# Patient Record
Sex: FEMALE | Race: WHITE | NOT HISPANIC OR LATINO | URBAN - METROPOLITAN AREA
[De-identification: names, ages, dates, MRNs, and addresses within clinical notes are randomized per-mention and may not be internally consistent; named-entity substitution may affect disease eponyms.]

---

## 2017-11-14 ENCOUNTER — OFFICE (OUTPATIENT)
Dept: URBAN - METROPOLITAN AREA CLINIC 75 | Facility: CLINIC | Age: 72
End: 2017-11-14
Payer: COMMERCIAL

## 2017-11-14 VITALS
SYSTOLIC BLOOD PRESSURE: 116 MMHG | WEIGHT: 138 LBS | HEIGHT: 65 IN | HEART RATE: 64 BPM | DIASTOLIC BLOOD PRESSURE: 84 MMHG

## 2017-11-14 DIAGNOSIS — K30 FUNCTIONAL DYSPEPSIA: ICD-10-CM

## 2017-11-14 DIAGNOSIS — R11.0 NAUSEA: ICD-10-CM

## 2017-11-14 PROCEDURE — 99202 OFFICE O/P NEW SF 15 MIN: CPT

## 2017-11-14 RX ORDER — ONDANSETRON 4 MG/1
12 TABLET, ORALLY DISINTEGRATING ORAL
Qty: 45 | Refills: 4 | Status: ACTIVE
Start: 2017-11-14

## 2017-12-04 VITALS
HEIGHT: 65 IN | SYSTOLIC BLOOD PRESSURE: 130 MMHG | HEART RATE: 78 BPM | DIASTOLIC BLOOD PRESSURE: 64 MMHG | WEIGHT: 140 LBS

## 2017-12-06 ENCOUNTER — OFFICE (OUTPATIENT)
Dept: URBAN - METROPOLITAN AREA CLINIC 75 | Facility: CLINIC | Age: 72
End: 2017-12-06
Payer: COMMERCIAL

## 2017-12-06 DIAGNOSIS — K21.9 GASTRO-ESOPHAGEAL REFLUX DISEASE WITHOUT ESOPHAGITIS: ICD-10-CM

## 2017-12-06 DIAGNOSIS — R11.0 NAUSEA: ICD-10-CM

## 2017-12-06 DIAGNOSIS — R10.11 RIGHT UPPER QUADRANT PAIN: ICD-10-CM

## 2017-12-06 PROCEDURE — 99213 OFFICE O/P EST LOW 20 MIN: CPT

## 2018-01-09 ENCOUNTER — ON CAMPUS - OUTPATIENT (OUTPATIENT)
Dept: URBAN - METROPOLITAN AREA HOSPITAL 108 | Facility: HOSPITAL | Age: 73
End: 2018-01-09
Payer: MEDICARE

## 2018-01-09 DIAGNOSIS — R10.11 RIGHT UPPER QUADRANT PAIN: ICD-10-CM

## 2018-01-09 DIAGNOSIS — R93.3 ABNORMAL FINDINGS ON DIAGNOSTIC IMAGING OF OTHER PARTS OF DI: ICD-10-CM

## 2018-01-09 DIAGNOSIS — K86.89 OTHER SPECIFIED DISEASES OF PANCREAS: ICD-10-CM

## 2018-01-09 DIAGNOSIS — K83.8 OTHER SPECIFIED DISEASES OF BILIARY TRACT: ICD-10-CM

## 2018-01-09 DIAGNOSIS — K83.1 OBSTRUCTION OF BILE DUCT: ICD-10-CM

## 2018-01-09 PROCEDURE — 43237 ENDOSCOPIC US EXAM ESOPH: CPT

## 2018-01-18 ENCOUNTER — ON CAMPUS - OUTPATIENT (OUTPATIENT)
Dept: URBAN - METROPOLITAN AREA HOSPITAL 108 | Facility: HOSPITAL | Age: 73
End: 2018-01-18
Payer: MEDICARE

## 2018-01-18 DIAGNOSIS — K83.1 OBSTRUCTION OF BILE DUCT: ICD-10-CM

## 2018-01-18 PROCEDURE — 43259 EGD US EXAM DUODENUM/JEJUNUM: CPT

## 2018-02-21 ENCOUNTER — OFFICE (OUTPATIENT)
Dept: URBAN - METROPOLITAN AREA CLINIC 75 | Facility: CLINIC | Age: 73
End: 2018-02-21
Payer: COMMERCIAL

## 2018-02-21 VITALS
HEART RATE: 80 BPM | DIASTOLIC BLOOD PRESSURE: 56 MMHG | SYSTOLIC BLOOD PRESSURE: 110 MMHG | WEIGHT: 142 LBS | HEIGHT: 65 IN

## 2018-02-21 DIAGNOSIS — K30 FUNCTIONAL DYSPEPSIA: ICD-10-CM

## 2018-02-21 DIAGNOSIS — R10.11 RIGHT UPPER QUADRANT PAIN: ICD-10-CM

## 2018-02-21 DIAGNOSIS — R11.0 NAUSEA: ICD-10-CM

## 2018-02-21 PROCEDURE — 99213 OFFICE O/P EST LOW 20 MIN: CPT

## 2018-03-15 ENCOUNTER — ON CAMPUS - OUTPATIENT (OUTPATIENT)
Dept: URBAN - METROPOLITAN AREA HOSPITAL 108 | Facility: HOSPITAL | Age: 73
End: 2018-03-15
Payer: COMMERCIAL

## 2018-03-15 DIAGNOSIS — K83.1 OBSTRUCTION OF BILE DUCT: ICD-10-CM

## 2018-03-15 DIAGNOSIS — Z96.89 PRESENCE OF OTHER SPECIFIED FUNCTIONAL IMPLANTS: ICD-10-CM

## 2018-03-15 DIAGNOSIS — Z98.890 OTHER SPECIFIED POSTPROCEDURAL STATES: ICD-10-CM

## 2018-03-15 PROCEDURE — 43275 ERCP REMOVE FORGN BODY DUCT: CPT

## 2018-05-09 ENCOUNTER — OFFICE (OUTPATIENT)
Dept: URBAN - METROPOLITAN AREA CLINIC 75 | Facility: CLINIC | Age: 73
End: 2018-05-09

## 2018-05-09 VITALS
WEIGHT: 139 LBS | DIASTOLIC BLOOD PRESSURE: 65 MMHG | HEIGHT: 65 IN | HEART RATE: 94 BPM | SYSTOLIC BLOOD PRESSURE: 109 MMHG

## 2018-05-09 DIAGNOSIS — R10.11 RIGHT UPPER QUADRANT PAIN: ICD-10-CM

## 2018-05-09 PROCEDURE — 99213 OFFICE O/P EST LOW 20 MIN: CPT

## 2018-05-09 RX ORDER — DICYCLOMINE HYDROCHLORIDE 10 MG/1
30 CAPSULE ORAL
Qty: 90 | Refills: 3 | Status: ACTIVE
Start: 2017-12-14

## 2025-02-26 ENCOUNTER — APPOINTMENT (OUTPATIENT)
Dept: CT IMAGING | Facility: HOSPITAL | Age: 80
End: 2025-02-26
Payer: MEDICARE

## 2025-02-26 ENCOUNTER — HOSPITAL ENCOUNTER (EMERGENCY)
Facility: HOSPITAL | Age: 80
Discharge: HOME | End: 2025-02-26
Attending: EMERGENCY MEDICINE | Admitting: EMERGENCY MEDICINE
Payer: MEDICARE

## 2025-02-26 VITALS
TEMPERATURE: 98 F | DIASTOLIC BLOOD PRESSURE: 62 MMHG | SYSTOLIC BLOOD PRESSURE: 145 MMHG | HEART RATE: 84 BPM | RESPIRATION RATE: 20 BRPM | HEIGHT: 65 IN | WEIGHT: 134.92 LBS | BODY MASS INDEX: 22.48 KG/M2 | OXYGEN SATURATION: 97 %

## 2025-02-26 DIAGNOSIS — S32.000A LUMBAR COMPRESSION FRACTURE, CLOSED, INITIAL ENCOUNTER: ICD-10-CM

## 2025-02-26 DIAGNOSIS — R10.11 ABDOMINAL PAIN, ACUTE, RIGHT UPPER QUADRANT: Primary | ICD-10-CM

## 2025-02-26 LAB
ALBUMIN SERPL-MCNC: 4.8 G/DL (ref 3.5–5.2)
ALBUMIN/GLOB SERPL: 1.7 G/DL
ALP SERPL-CCNC: 83 U/L (ref 39–117)
ALT SERPL W P-5'-P-CCNC: 13 U/L (ref 1–33)
ANION GAP SERPL CALCULATED.3IONS-SCNC: 14.6 MMOL/L (ref 5–15)
AST SERPL-CCNC: 18 U/L (ref 1–32)
BACTERIA UR QL AUTO: ABNORMAL /HPF
BASOPHILS # BLD AUTO: 0.04 10*3/MM3 (ref 0–0.2)
BASOPHILS NFR BLD AUTO: 0.4 % (ref 0–1.5)
BILIRUB SERPL-MCNC: 0.6 MG/DL (ref 0–1.2)
BILIRUB UR QL STRIP: NEGATIVE
BUN SERPL-MCNC: 17 MG/DL (ref 8–23)
BUN/CREAT SERPL: 28.8 (ref 7–25)
CALCIUM SPEC-SCNC: 10.5 MG/DL (ref 8.6–10.5)
CHLORIDE SERPL-SCNC: 97 MMOL/L (ref 98–107)
CLARITY UR: ABNORMAL
CO2 SERPL-SCNC: 26.4 MMOL/L (ref 22–29)
COLOR UR: YELLOW
CREAT SERPL-MCNC: 0.59 MG/DL (ref 0.57–1)
D-LACTATE SERPL-SCNC: 1.7 MMOL/L (ref 0.5–2)
DEPRECATED RDW RBC AUTO: 41.1 FL (ref 37–54)
EGFRCR SERPLBLD CKD-EPI 2021: 93 ML/MIN/1.73
EOSINOPHIL # BLD AUTO: 0.04 10*3/MM3 (ref 0–0.4)
EOSINOPHIL NFR BLD AUTO: 0.4 % (ref 0.3–6.2)
ERYTHROCYTE [DISTWIDTH] IN BLOOD BY AUTOMATED COUNT: 12.7 % (ref 12.3–15.4)
GLOBULIN UR ELPH-MCNC: 2.8 GM/DL
GLUCOSE SERPL-MCNC: 104 MG/DL (ref 65–99)
GLUCOSE UR STRIP-MCNC: NEGATIVE MG/DL
HCT VFR BLD AUTO: 30.3 % (ref 34–46.6)
HGB BLD-MCNC: 10.2 G/DL (ref 12–15.9)
HGB UR QL STRIP.AUTO: ABNORMAL
HOLD SPECIMEN: NORMAL
HOLD SPECIMEN: NORMAL
HYALINE CASTS UR QL AUTO: ABNORMAL /LPF
IMM GRANULOCYTES # BLD AUTO: 0.05 10*3/MM3 (ref 0–0.05)
IMM GRANULOCYTES NFR BLD AUTO: 0.5 % (ref 0–0.5)
KETONES UR QL STRIP: NEGATIVE
LEUKOCYTE ESTERASE UR QL STRIP.AUTO: ABNORMAL
LIPASE SERPL-CCNC: 16 U/L (ref 13–60)
LYMPHOCYTES # BLD AUTO: 1.99 10*3/MM3 (ref 0.7–3.1)
LYMPHOCYTES NFR BLD AUTO: 21.5 % (ref 19.6–45.3)
MCH RBC QN AUTO: 30.5 PG (ref 26.6–33)
MCHC RBC AUTO-ENTMCNC: 33.7 G/DL (ref 31.5–35.7)
MCV RBC AUTO: 90.7 FL (ref 79–97)
MONOCYTES # BLD AUTO: 0.47 10*3/MM3 (ref 0.1–0.9)
MONOCYTES NFR BLD AUTO: 5.1 % (ref 5–12)
NEUTROPHILS NFR BLD AUTO: 6.67 10*3/MM3 (ref 1.7–7)
NEUTROPHILS NFR BLD AUTO: 72.1 % (ref 42.7–76)
NITRITE UR QL STRIP: NEGATIVE
NRBC BLD AUTO-RTO: 0 /100 WBC (ref 0–0.2)
PH UR STRIP.AUTO: 8.5 [PH] (ref 5–8)
PLATELET # BLD AUTO: 443 10*3/MM3 (ref 140–450)
PMV BLD AUTO: 9 FL (ref 6–12)
POTASSIUM SERPL-SCNC: 3.8 MMOL/L (ref 3.5–5.2)
PROT SERPL-MCNC: 7.6 G/DL (ref 6–8.5)
PROT UR QL STRIP: ABNORMAL
RBC # BLD AUTO: 3.34 10*6/MM3 (ref 3.77–5.28)
RBC # UR STRIP: ABNORMAL /HPF
REF LAB TEST METHOD: ABNORMAL
SODIUM SERPL-SCNC: 138 MMOL/L (ref 136–145)
SP GR UR STRIP: 1.02 (ref 1–1.03)
SQUAMOUS #/AREA URNS HPF: ABNORMAL /HPF
UROBILINOGEN UR QL STRIP: ABNORMAL
WBC # UR STRIP: ABNORMAL /HPF
WBC NRBC COR # BLD AUTO: 9.26 10*3/MM3 (ref 3.4–10.8)
WHOLE BLOOD HOLD COAG: NORMAL
WHOLE BLOOD HOLD SPECIMEN: NORMAL

## 2025-02-26 PROCEDURE — 74176 CT ABD & PELVIS W/O CONTRAST: CPT

## 2025-02-26 PROCEDURE — 25010000002 MORPHINE PER 10 MG: Performed by: EMERGENCY MEDICINE

## 2025-02-26 PROCEDURE — 81001 URINALYSIS AUTO W/SCOPE: CPT | Performed by: EMERGENCY MEDICINE

## 2025-02-26 PROCEDURE — 83690 ASSAY OF LIPASE: CPT | Performed by: EMERGENCY MEDICINE

## 2025-02-26 PROCEDURE — 83605 ASSAY OF LACTIC ACID: CPT | Performed by: EMERGENCY MEDICINE

## 2025-02-26 PROCEDURE — 85025 COMPLETE CBC W/AUTO DIFF WBC: CPT

## 2025-02-26 PROCEDURE — 99284 EMERGENCY DEPT VISIT MOD MDM: CPT

## 2025-02-26 PROCEDURE — 25010000002 ONDANSETRON PER 1 MG: Performed by: EMERGENCY MEDICINE

## 2025-02-26 PROCEDURE — 96374 THER/PROPH/DIAG INJ IV PUSH: CPT

## 2025-02-26 PROCEDURE — 96375 TX/PRO/DX INJ NEW DRUG ADDON: CPT

## 2025-02-26 PROCEDURE — 80053 COMPREHEN METABOLIC PANEL: CPT | Performed by: EMERGENCY MEDICINE

## 2025-02-26 RX ORDER — MORPHINE SULFATE 2 MG/ML
4 INJECTION, SOLUTION INTRAMUSCULAR; INTRAVENOUS ONCE
Status: COMPLETED | OUTPATIENT
Start: 2025-02-26 | End: 2025-02-26

## 2025-02-26 RX ORDER — FAMOTIDINE 10 MG/ML
20 INJECTION, SOLUTION INTRAVENOUS ONCE
Status: COMPLETED | OUTPATIENT
Start: 2025-02-26 | End: 2025-02-26

## 2025-02-26 RX ORDER — LIDOCAINE 4 G/G
1 PATCH TOPICAL DAILY
Qty: 5 EACH | Refills: 0 | Status: SHIPPED | OUTPATIENT
Start: 2025-02-26

## 2025-02-26 RX ORDER — ONDANSETRON 2 MG/ML
4 INJECTION INTRAMUSCULAR; INTRAVENOUS ONCE
Status: COMPLETED | OUTPATIENT
Start: 2025-02-26 | End: 2025-02-26

## 2025-02-26 RX ORDER — SODIUM CHLORIDE 0.9 % (FLUSH) 0.9 %
10 SYRINGE (ML) INJECTION AS NEEDED
Status: DISCONTINUED | OUTPATIENT
Start: 2025-02-26 | End: 2025-02-26 | Stop reason: HOSPADM

## 2025-02-26 RX ADMIN — MORPHINE SULFATE 4 MG: 2 INJECTION, SOLUTION INTRAMUSCULAR; INTRAVENOUS at 08:56

## 2025-02-26 RX ADMIN — ONDANSETRON 4 MG: 2 INJECTION, SOLUTION INTRAMUSCULAR; INTRAVENOUS at 08:52

## 2025-02-26 RX ADMIN — FAMOTIDINE 20 MG: 10 INJECTION INTRAVENOUS at 08:52

## 2025-02-26 NOTE — ED PROVIDER NOTES
Brief history of present illness: 79 female's been struggling a lot with her back pain recently from vertebral collapse and has been on several medications reports progressive right upper quadrant abdominal discomfort when she takes her pills.  She has been seen by gastroenterology, Dr. Soliman, who is recommended Carafate though when she was taking this she reported increased insomnia and restlessness at night.  She was taking it at the same time as her gabapentin and has only been using her Zoloft as needed.  No vomiting or bloody stools reported.  No fevers.  She has been on a BRAT diet which seems to have helped.  Past surgical history includes cholecystectomy      Physical Exam   Constitutional: No distress.  Nontoxic  HENT:  Head: Normocephalic and atraumatic.   Oropharynx: Mucous membranes are moist.   Eyes: . No scleral icterus. No conjunctival pallor.  Neck: Normal range of motion. Neck supple.   Cardiovascular: Pink warm and well perfused throughout.    Pulmonary/Chest: No respiratory distress.  No tachypnea or increased work of breathing appreciated.    Abdominal: Soft. There is mild right upper quadrant tenderness. There is no rebound and no guarding.  No peritoneal findings  Musculoskeletal: Moves all extremities equally.    Neurological: Alert and oriented.  No acute focal deficit appreciated.  Skin: Skin is pink, warm, and dry.   Psychiatric: Mood and affect normal.   Nursing note and vitals reviewed.      MDM:   Results for orders placed or performed during the hospital encounter of 02/26/25   Comprehensive Metabolic Panel    Collection Time: 02/26/25  7:49 AM    Specimen: Blood   Result Value Ref Range    Glucose 104 (H) 65 - 99 mg/dL    BUN 17 8 - 23 mg/dL    Creatinine 0.59 0.57 - 1.00 mg/dL    Sodium 138 136 - 145 mmol/L    Potassium 3.8 3.5 - 5.2 mmol/L    Chloride 97 (L) 98 - 107 mmol/L    CO2 26.4 22.0 - 29.0 mmol/L    Calcium 10.5 8.6 - 10.5 mg/dL    Total Protein 7.6 6.0 - 8.5 g/dL     Albumin 4.8 3.5 - 5.2 g/dL    ALT (SGPT) 13 1 - 33 U/L    AST (SGOT) 18 1 - 32 U/L    Alkaline Phosphatase 83 39 - 117 U/L    Total Bilirubin 0.6 0.0 - 1.2 mg/dL    Globulin 2.8 gm/dL    A/G Ratio 1.7 g/dL    BUN/Creatinine Ratio 28.8 (H) 7.0 - 25.0    Anion Gap 14.6 5.0 - 15.0 mmol/L    eGFR 93.0 >60.0 mL/min/1.73   Lipase    Collection Time: 02/26/25  7:49 AM    Specimen: Blood   Result Value Ref Range    Lipase 16 13 - 60 U/L   Lactic Acid, Plasma    Collection Time: 02/26/25  7:49 AM    Specimen: Blood   Result Value Ref Range    Lactate 1.7 0.5 - 2.0 mmol/L   CBC Auto Differential    Collection Time: 02/26/25  7:49 AM    Specimen: Blood   Result Value Ref Range    WBC 9.26 3.40 - 10.80 10*3/mm3    RBC 3.34 (L) 3.77 - 5.28 10*6/mm3    Hemoglobin 10.2 (L) 12.0 - 15.9 g/dL    Hematocrit 30.3 (L) 34.0 - 46.6 %    MCV 90.7 79.0 - 97.0 fL    MCH 30.5 26.6 - 33.0 pg    MCHC 33.7 31.5 - 35.7 g/dL    RDW 12.7 12.3 - 15.4 %    RDW-SD 41.1 37.0 - 54.0 fl    MPV 9.0 6.0 - 12.0 fL    Platelets 443 140 - 450 10*3/mm3    Neutrophil % 72.1 42.7 - 76.0 %    Lymphocyte % 21.5 19.6 - 45.3 %    Monocyte % 5.1 5.0 - 12.0 %    Eosinophil % 0.4 0.3 - 6.2 %    Basophil % 0.4 0.0 - 1.5 %    Immature Grans % 0.5 0.0 - 0.5 %    Neutrophils, Absolute 6.67 1.70 - 7.00 10*3/mm3    Lymphocytes, Absolute 1.99 0.70 - 3.10 10*3/mm3    Monocytes, Absolute 0.47 0.10 - 0.90 10*3/mm3    Eosinophils, Absolute 0.04 0.00 - 0.40 10*3/mm3    Basophils, Absolute 0.04 0.00 - 0.20 10*3/mm3    Immature Grans, Absolute 0.05 0.00 - 0.05 10*3/mm3    nRBC 0.0 0.0 - 0.2 /100 WBC   Green Top (Gel)    Collection Time: 02/26/25  7:49 AM   Result Value Ref Range    Extra Tube Hold for add-ons.    Lavender Top    Collection Time: 02/26/25  7:49 AM   Result Value Ref Range    Extra Tube hold for add-on    Gold Top - SST    Collection Time: 02/26/25  7:49 AM   Result Value Ref Range    Extra Tube Hold for add-ons.    Light Blue Top    Collection Time: 02/26/25  7:49 AM    Result Value Ref Range    Extra Tube Hold for add-ons.    Urinalysis With Microscopic If Indicated (No Culture) - Urine, Clean Catch    Collection Time: 02/26/25  8:52 AM    Specimen: Urine, Clean Catch   Result Value Ref Range    Color, UA Yellow Yellow, Straw    Appearance, UA Turbid (A) Clear    pH, UA 8.5 (H) 5.0 - 8.0    Specific Gravity, UA 1.017 1.005 - 1.030    Glucose, UA Negative Negative    Ketones, UA Negative Negative    Bilirubin, UA Negative Negative    Blood, UA Trace (A) Negative    Protein,  mg/dL (2+) (A) Negative    Leuk Esterase, UA Trace (A) Negative    Nitrite, UA Negative Negative    Urobilinogen, UA 0.2 E.U./dL 0.2 - 1.0 E.U./dL   Urinalysis, Microscopic Only - Urine, Clean Catch    Collection Time: 02/26/25  8:52 AM    Specimen: Urine, Clean Catch   Result Value Ref Range    RBC, UA 11-20 (A) None Seen, 0-2 /HPF    WBC, UA 0-2 None Seen, 0-2 /HPF    Bacteria, UA None Seen None Seen /HPF    Squamous Epithelial Cells, UA 0-2 None Seen, 0-2 /HPF    Hyaline Casts, UA 3-6 None Seen /LPF    Methodology Automated Microscopy        CT Abdomen Pelvis Without Contrast    Result Date: 2/26/2025  Narrative: CT ABDOMEN PELVIS WO CONTRAST-  HISTORY: 79 years of age, Female. Upper abdominal pain.  TECHNIQUE:  CT includes axial imaging from the lung bases to the trochanters with intravenous contrast and with use of oral contrast. Data reconstructed in coronal and sagittal planes. Radiation dose reduction techniques were utilized, including automated exposure control and exposure modulation based on body size.  COMPARISON: None.  FINDINGS: Calcified nodule left lower lobe. Liver, spleen, adrenal glands, pancreas, kidneys appear within normal limits. Previous cholecystectomy. Pneumobilia. No hydronephrosis. No renal or ureteral stone.  No melvin enlargement in the abdomen or pelvis. No bowel dilatation or evidence for obstruction. No ascites. Mild atherosclerotic calcifications are present involving  the abdominal aorta and iliac vasculature.  There is a compression fracture of L1 with 80% height loss. Retropulsion posterior-superior aspect of the L1 body with moderate canal narrowing. Osteopenia. Degenerative disc disease with Schmorl's node formation. There is a sagittally oriented fracture extending through the left L5 pedicle near the base of the left L5 transverse process without displacement. This is evident on coronal images 63-67. There has been previous posterior decompression at L4-L5. 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      Impression: 1. No renal stone or hydronephrosis or evidence for acute abnormality in the abdomen/pelvis. 2. L1 compression fracture with 80% height loss and retropulsion of the posterior-superior aspect of the L1 body with moderate canal narrowing. There is also a nondisplaced sagittally oriented fracture extending through the left L5 pedicle and base of the left L5 transverse process and this fracture is of uncertain age. There are no previous studies for comparison. 3. Osteopenia. 4. Pneumobilia. Previous cholecystectomy. 5. Ankylosis of the sacroiliac joints.  Radiation dose reduction techniques were utilized, including automated exposure control and exposure modulation based on body size.       NM bone limited    Result Date: 2/12/2025  Narrative: INDICATION:   Lumbar spine fracture. No reported known injury TECHNIQUE: Limited bone scan attention to the thoracic and lumbar spine. 22 mCi technetium labeled MDP COMPARISON:   Lumbar spine series 06/25/2024, CT of the abdomen and pelvis 01/28/2025 FINDINGS/impression: The uptake localizing to the L1 level, correlating with the subtle fracture shown on the comparison CT. No other abnormal uptake. Dictated by: Curtis Izaguirre MD Signed by Curtis Izaguirre MD on 2/12/2025 9:32 ##### Final ##### Dictated by:    CURTIS IZAGUIRRE MD-RAD Dictated DT/TM: 02/12/2025 9:32 am Interpreted and electronically signed by:  CURTIS IZAGUIRRE MD-RAD  Signed DT/TM:  02/12/2025 9:32 am    CT Abdomen Pelvis Without Contrast    Result Date: 1/28/2025  Narrative: EXAMINATION: CT Abdomen Pelvis WO ACCESSION NUMBER: [id number] DATE: 1/28/2025 20:57 PROVIDED INDICATION: Right flank pain. COMPARISON: Abdominopelvic CT is dated 12/11/2024, 11/12/2019 and 10/30/2019. TECHNIQUE: Axial computed tomographic images of the abdomen and pelvis without intravenous contrast. Oral contrast was not administered. Reformatted images include axial, sagittal, and coronal. FINDINGS: Assessment of the solid organs of vasculature is limited without intravenous contrast. Lower chest: A punctate calcified granulomas present within the left lower lobe. Minimal bilateral scarring is present. The lung bases are otherwise clear. Heart size is within normal limits. No pleural or pericardial effusion. Liver: No focal liver lesion. Biliary: Status post cholecystectomy with no bile duct dilation. Pancreas: Diffusely atrophied with fatty infiltration. No discrete lesion or duct dilation. Spleen: The spleen is normal in size. Adrenal glands: No adrenal mass. Kidneys and ureters: Previously seen mild hydroureteronephrosis has resolved. There are no calcifications within the renal collecting systems or ureters. Urinary bladder: Wall thickness of the urinary bladder is normal. Reproductive: The uterus is surgically absent. No adnexal masses are visualized. Gastrointestinal: The stomach, small bowel, large bowel, and mesentery are normal. A moderate volume of formed stool is present throughout the colon. The appendix is not visualized. Lymphatic: No lymphadenopathy. Vessels: Scattered atherosclerotic calcification in the abdominal aorta and its branch vessels. Peritoneum: No fluid collection, free intraperitoneal fluid, or free intraperitoneal air. Body wall: No hernia or mass. Bones: Mild multilevel degenerative changes of the thoracolumbar spine are redemonstrated and not significantly progressed from  the prior examination. There is a new subtle anterior inferior fracture deformity of the L1 vertebral body less than 10% loss of vertebral body height. Questionable minimal adjacent callus formation is identified, consistent with a healing injury. No additional acute or aggressive osseous abnormality is identified. IMPRESSION: 1.  No evidence of urolithiasis. The hydroureteronephrosis seen on the prior CT examination has resolved in the interim. 2.  There is a new anterior inferior fracture of L1 when compared to the 12/11/2024 examination. However, there is questionable minimal adjacent callus formation, suggesting that this injury is subacute/healing. Correlation with patient history is recommended to assess for any recent trauma/fall. 3.  Moderate volume colonic stool burden is suggestive of constipation. 4.  Chronic and/or incidental appearing findings are discussed above. Dictated by: Poncho Oliveros M.D. Signed by Poncho Oliveros M.D. on 1/28/2025 21:10 ##### Final ##### Dictated by:    PONCHO OLIVEROS MD-RAD Dictated DT/TM: 01/28/2025 9:10 pm Interpreted and electronically signed by:  PONCHO OLIVEROS MD-RAD Signed DT/TM:  01/28/2025 9:10 pm      My differential diagnosis for abdominal pain includes but is not limited to:  Gastritis, gastroenteritis, peptic ulcer disease, GERD, esophageal perforation, acute appendicitis, mesenteric adenitis, Meckel’s diverticulum, epiploic appendagitis, diverticulitis, colon cancer, ulcerative colitis, Crohn’s disease, intussusception, small bowel obstruction, adhesions, ischemic bowel, perforated viscus, ileus, obstipation, biliary colic, cholecystitis, cholelithiasis, Tom-Darian Bobby, hepatitis, pancreatitis, common bile duct obstruction, cholangitis, bile leak, splenic trauma, splenic rupture, splenic infarction, splenic abscess, abdominal abscess, ascites, spontaneous bacterial peritonitis, hernia, UTI, cystitis,ureterolithiasis, urinary obstruction, ovarian cyst,  torsion, pregnancy, ectopic pregnancy, PID, pelvic abscess, mittelschmerz, endometriosis, AAA, myocardial infarction, pneumonia, cancer, porphyria, DKA, medications, sickle cell, viral syndrome, zoster      I have seen and personally evaluated this patient, discussed the case with the treating advanced practice provider, and reviewed their note. I was involved in the medical decision making during the evaluation, testing and disposition planning for this patient.    Progress:  ED Course as of 02/26/25 1011   Wed Feb 26, 2025   0828 WBC: 9.26 [KA]   0828 Hemoglobin(!): 10.2  Chronic stable disease [RS]   0828 Glucose(!): 104 [KA]   0828 Creatinine: 0.59 [KA]   0828 Lactate: 1.7 [KA]   0828 Lipase: 16 [KA]   0840 Lipase: 16 [KA]   0840 Lactate: 1.7 [KA]   0929 RBC, UA(!): 11-20 [KA]   0944 WBC, UA: 0-2 [RS]   0944 Bacteria, UA: None Seen [RS]   0944 Nitrite, UA: Negative [RS]   0944 RADIOLOGY      Study: Noncontrast CT abdomen pelvis  Findings: No evidence of bowel obstruction  I independently viewed and interpreted these images contemporaneously with treatment.    [RS]   1006 I had a long discussion with the patient and her family.  Patient is very likely struggling with either gastritis or duodenitis.  She even has the potential for duodenal ulcer.  She is been on quite a lot of medications recently.  She does see gastroenterology, Dr. Soliman, and has good follow-up scheduled.  She is not taking the Carafate because she was worried it was making her anxious.  It turns out that probably the Carafate is limiting her absorption of her other medications.  I instructed her to take her other medications 30 minutes before any doses of Carafate and this will help.  Patient still having discomfort and we talked about this being an issue that is going to take some time to resolve.  No movement pain medications will help.  In fact I recommend holding off on all oral pain medications with Tylenol and switching to topical  analgesics for back pain.  She requests additional information about a second opinion.  We will send her to see our neurosurgeons as well.  Patient and family agreeable discharge planning. [RS]      ED Course User Index  [KA] Kaitlynn Shaw PA-C  [RS] Felipe Swanson MD         Final diagnoses:   Abdominal pain, acute, right upper quadrant   Chronic lumbar compression fracture, closed, initial encounter       Disposition:  DISCHARGE    Patient discharged in stable condition.    Reviewed implications of results, diagnosis, meds, responsibility to follow up, warning signs and symptoms of possible worsening, potential complications and reasons to return to ER.    Patient/Family voiced understanding of above instructions.    Discussed plan for discharge, as there is no emergent indication for admission. Patient referred to primary care provider for regular health maintenance. Pt/family is agreeable and understands need for follow up and possible repeat testing.  Pt is aware that discharge does not mean that nothing is wrong but it indicates no emergency is present that requires admission and they must continue care with follow-up as given below or physician of their choice.     FOLLOW-UP  Nitesh Fuentes MD  [address] [place] [address] [address], [address]  [facility] [id number] [phone]    Schedule an appointment as soon as possible for a visit       Ramírez Soliman MD  [address] [facility] [address]  [facility] [id number] [phone]    Call today      Demarcus Morales MD  [address] [facility] [address]  [facility] [id number] [phone]    Call today  Schedule close outpatient follow-up         Medication List        New Prescriptions      Diclofenac Sodium 1 % gel gel  Commonly known as: VOLTAREN  Apply 4 g topically to the appropriate area as directed 2 (Two) Times a Day.     Lidocaine 4 %  Place 1 patch on the skin as directed by provider Daily.            Stop      TRAMADOL & DIETARY MANAGE PROD PO               Where to  Get Your Medications        You can get these medications from any pharmacy    Bring a paper prescription for each of these medications  Diclofenac Sodium 1 % gel gel  Lidocaine 4 %            Felipe Swanson MD  02/26/25 1010

## 2025-02-26 NOTE — ED NOTES
Pt arrived via PV with c/o abdominal pain that started a week ago. Pt reports N&D, and states she currently has a UTI that she is being treated for.

## 2025-02-26 NOTE — ED PROVIDER NOTES
EMERGENCY DEPARTMENT ENCOUNTER  Room Number:  26/26  PCP: Nitesh Fuentes MD  Independent Historians: Patient      HPI:  Chief Complaint: had concerns including Abdominal Pain.     A complete HPI/ROS/PMH/PSH/SH/FH are unobtainable due to: None    Chronic or social conditions impacting patient care (Social Determinants of Health): None      Context: The patient is a 79 y.o. female with a medical history of GERD, glaucoma, anxiety, vertigo, osteoporosis, who presents to the ED c/o acute right-sided abdominal pain.  It has been present for 1 month, is constant, states it is worse when she takes hydrocodone and also was noted to be worse when she took Azo yesterday, has not noticed anything that makes it better.  She has been eating a bland diet of toast and oatmeal and applesauce and tolerating those okay but is apprehensive to try anything else.  She feels it radiates to her bilateral flanks.  She has a history of choledocholithiasis, states she required biliary stents in 2019 which were removed 3 months later.  She has had multiple ER visits with inadequate relief of her symptoms.  States she was prescribed Carafate by her PCP last week and she took it twice without improvement and discontinued it.  She was at an emergency department a couple days ago and diagnosed with a UTI.  She has had nausea without vomiting, denies fever diarrhea and urinary symptoms today.  She is currently on pain medication due to her compression fracture in her back.      Review of prior external notes (non-ED) -and- Review of prior external test results outside of this encounter:  Labs performed 2/24/2025 and hemoglobin was 10.4, white blood cell count 8.3 , Creatinine 0.61, LFTs were within normal range and lipase was normal at 17    Patient evaluated at [facility] in [place] on 2/24/2025 and diagnosed with acute UTI, dyspepsia.  Prescribed Pepcid and Keflex    CT abdomen pelvis performed on 1/28/2025 and  showed no urolithiasis and hydroureteronephrosis on prior CT had resolved, also had some new anterior inferior fracture of L1 that appears subacute, moderate volume of colonic stool     EGD and colonoscopy performed on 1/16/2025 and noted benign looking gastric polyps, normal esophageal and gastric mucosa, unremarkable duodenum, small polyps in the colon and diverticulosis without diverticulitis.        PAST MEDICAL HISTORY  Active Ambulatory Problems     Diagnosis Date Noted    No Active Ambulatory Problems     Resolved Ambulatory Problems     Diagnosis Date Noted    No Resolved Ambulatory Problems     Past Medical History:   Diagnosis Date    GERD (gastroesophageal reflux disease)     Glaucoma          PAST SURGICAL HISTORY  Past Surgical History:   Procedure Laterality Date    CATARACT EXTRACTION, BILATERAL      SHOULDER SURGERY           FAMILY HISTORY  Family History   Problem Relation Age of Onset    No Known Problems Mother          SOCIAL HISTORY  Social History     Socioeconomic History    Marital status:    Tobacco Use    Smoking status: Never    Smokeless tobacco: Never   Vaping Use    Vaping status: Never Used   Substance and Sexual Activity    Alcohol use: Never    Drug use: Never    Sexual activity: Defer         ALLERGIES  Alendronate, Chlorhexidine, and Chlorhexidine gluconate      REVIEW OF SYSTEMS  Review of Systems  Included in HPI  All systems reviewed and negative except for those discussed in HPI.      PHYSICAL EXAM    I have reviewed the triage vital signs and nursing notes.    ED Triage Vitals   Temp Heart Rate Resp BP SpO2   02/26/25 0737 02/26/25 0737 02/26/25 0737 02/26/25 0749 02/26/25 0737   98 °F (36.7 °C) 96 20 145/71 99 %      Temp src Heart Rate Source Patient Position BP Location FiO2 (%)   -- -- -- -- --              Physical Exam  GENERAL: alert, no acute distress  SKIN: Warm, dry  HENT: Normocephalic, atraumatic  EYES: no scleral icterus  CV: regular rhythm, regular  rate  RESPIRATORY: normal effort, lungs clear  ABDOMEN: nondistended soft,epigastric and periumbilical tenderness, nondistended normal bowel sounds no guarding or rigidity  MUSCULOSKELETAL: no deformity  NEURO: alert, moves all extremities, follows commands            LAB RESULTS  Recent Results (from the past 24 hours)   Comprehensive Metabolic Panel    Collection Time: 02/26/25  7:49 AM    Specimen: Blood   Result Value Ref Range    Glucose 104 (H) 65 - 99 mg/dL    BUN 17 8 - 23 mg/dL    Creatinine 0.59 0.57 - 1.00 mg/dL    Sodium 138 136 - 145 mmol/L    Potassium 3.8 3.5 - 5.2 mmol/L    Chloride 97 (L) 98 - 107 mmol/L    CO2 26.4 22.0 - 29.0 mmol/L    Calcium 10.5 8.6 - 10.5 mg/dL    Total Protein 7.6 6.0 - 8.5 g/dL    Albumin 4.8 3.5 - 5.2 g/dL    ALT (SGPT) 13 1 - 33 U/L    AST (SGOT) 18 1 - 32 U/L    Alkaline Phosphatase 83 39 - 117 U/L    Total Bilirubin 0.6 0.0 - 1.2 mg/dL    Globulin 2.8 gm/dL    A/G Ratio 1.7 g/dL    BUN/Creatinine Ratio 28.8 (H) 7.0 - 25.0    Anion Gap 14.6 5.0 - 15.0 mmol/L    eGFR 93.0 >60.0 mL/min/1.73   Lipase    Collection Time: 02/26/25  7:49 AM    Specimen: Blood   Result Value Ref Range    Lipase 16 13 - 60 U/L   Lactic Acid, Plasma    Collection Time: 02/26/25  7:49 AM    Specimen: Blood   Result Value Ref Range    Lactate 1.7 0.5 - 2.0 mmol/L   Green Top (Gel)    Collection Time: 02/26/25  7:49 AM   Result Value Ref Range    Extra Tube Hold for add-ons.    Lavender Top    Collection Time: 02/26/25  7:49 AM   Result Value Ref Range    Extra Tube hold for add-on    Gold Top - SST    Collection Time: 02/26/25  7:49 AM   Result Value Ref Range    Extra Tube Hold for add-ons.    Light Blue Top    Collection Time: 02/26/25  7:49 AM   Result Value Ref Range    Extra Tube Hold for add-ons.    CBC Auto Differential    Collection Time: 02/26/25  7:49 AM    Specimen: Blood   Result Value Ref Range    WBC 9.26 3.40 - 10.80 10*3/mm3    RBC 3.34 (L) 3.77 - 5.28 10*6/mm3    Hemoglobin 10.2 (L)  12.0 - 15.9 g/dL    Hematocrit 30.3 (L) 34.0 - 46.6 %    MCV 90.7 79.0 - 97.0 fL    MCH 30.5 26.6 - 33.0 pg    MCHC 33.7 31.5 - 35.7 g/dL    RDW 12.7 12.3 - 15.4 %    RDW-SD 41.1 37.0 - 54.0 fl    MPV 9.0 6.0 - 12.0 fL    Platelets 443 140 - 450 10*3/mm3    Neutrophil % 72.1 42.7 - 76.0 %    Lymphocyte % 21.5 19.6 - 45.3 %    Monocyte % 5.1 5.0 - 12.0 %    Eosinophil % 0.4 0.3 - 6.2 %    Basophil % 0.4 0.0 - 1.5 %    Immature Grans % 0.5 0.0 - 0.5 %    Neutrophils, Absolute 6.67 1.70 - 7.00 10*3/mm3    Lymphocytes, Absolute 1.99 0.70 - 3.10 10*3/mm3    Monocytes, Absolute 0.47 0.10 - 0.90 10*3/mm3    Eosinophils, Absolute 0.04 0.00 - 0.40 10*3/mm3    Basophils, Absolute 0.04 0.00 - 0.20 10*3/mm3    Immature Grans, Absolute 0.05 0.00 - 0.05 10*3/mm3    nRBC 0.0 0.0 - 0.2 /100 WBC   Urinalysis With Microscopic If Indicated (No Culture) - Urine, Clean Catch    Collection Time: 02/26/25  8:52 AM    Specimen: Urine, Clean Catch   Result Value Ref Range    Color, UA Yellow Yellow, Straw    Appearance, UA Turbid (A) Clear    pH, UA 8.5 (H) 5.0 - 8.0    Specific Gravity, UA 1.017 1.005 - 1.030    Glucose, UA Negative Negative    Ketones, UA Negative Negative    Bilirubin, UA Negative Negative    Blood, UA Trace (A) Negative    Protein,  mg/dL (2+) (A) Negative    Leuk Esterase, UA Trace (A) Negative    Nitrite, UA Negative Negative    Urobilinogen, UA 0.2 E.U./dL 0.2 - 1.0 E.U./dL   Urinalysis, Microscopic Only - Urine, Clean Catch    Collection Time: 02/26/25  8:52 AM    Specimen: Urine, Clean Catch   Result Value Ref Range    RBC, UA 11-20 (A) None Seen, 0-2 /HPF    WBC, UA 0-2 None Seen, 0-2 /HPF    Bacteria, UA None Seen None Seen /HPF    Squamous Epithelial Cells, UA 0-2 None Seen, 0-2 /HPF    Hyaline Casts, UA 3-6 None Seen /LPF    Methodology Automated Microscopy          RADIOLOGY  CT Abdomen Pelvis Without Contrast    Result Date: 2/26/2025  CT ABDOMEN PELVIS WO CONTRAST-  HISTORY: 79 years of age, Female.  Upper abdominal pain.  TECHNIQUE:  CT includes axial imaging from the lung bases to the trochanters with intravenous contrast and with use of oral contrast. Data reconstructed in coronal and sagittal planes. Radiation dose reduction techniques were utilized, including automated exposure control and exposure modulation based on body size.  COMPARISON: None.  FINDINGS: Calcified nodule left lower lobe. Liver, spleen, adrenal glands, pancreas, kidneys appear within normal limits. Previous cholecystectomy. Pneumobilia. No hydronephrosis. No renal or ureteral stone.  No melvin enlargement in the abdomen or pelvis. No bowel dilatation or evidence for obstruction. No ascites. Mild atherosclerotic calcifications are present involving the abdominal aorta and iliac vasculature.  There is a compression fracture of L1 with 80% height loss. Retropulsion posterior-superior aspect of the L1 body with moderate canal narrowing. Osteopenia. Degenerative disc disease with Schmorl's node formation. There is a sagittally oriented fracture extending through the left L5 pedicle near the base of the left L5 transverse process without displacement. This is evident on coronal images 63-67. There has been previous posterior decompression at L4-L5. 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      1. No renal stone or hydronephrosis or evidence for acute abnormality in the abdomen/pelvis. 2. L1 compression fracture with 80% height loss and retropulsion of the posterior-superior aspect of the L1 body with moderate canal narrowing. There is also a nondisplaced sagittally oriented fracture extending through the left L5 pedicle and base of the left L5 transverse process and this fracture is of uncertain age though does not appear to be acute. There are no previous studies for comparison. 3. Osteopenia. 4. Pneumobilia. Previous cholecystectomy. 5. Ankylosis of the sacroiliac joints.  Radiation dose reduction techniques were utilized, including  automated exposure control and exposure modulation based on body size.   This report was finalized on 2/26/2025 10:30 AM by Navin Wright M.D on Workstation: [id number]         MEDICATIONS GIVEN IN ER  Medications   morphine injection 4 mg (4 mg Intravenous Given 2/26/25 0856)   ondansetron (ZOFRAN) injection 4 mg (4 mg Intravenous Given 2/26/25 0852)   famotidine (PEPCID) injection 20 mg (20 mg Intravenous Given 2/26/25 0852)         ORDERS PLACED DURING THIS VISIT:  Orders Placed This Encounter   Procedures    CT Abdomen Pelvis Without Contrast    [place] Draw    Comprehensive Metabolic Panel    Lipase    Urinalysis With Microscopic If Indicated (No Culture) - Urine, Clean Catch    Lactic Acid, Plasma    CBC Auto Differential    Urinalysis, Microscopic Only - Urine, Clean Catch    Undress & Gown    CBC & Differential    Green Top (Gel)    Lavender Top    Gold Top - SST    Light Blue Top         OUTPATIENT MEDICATION MANAGEMENT:  No current Epic-ordered facility-administered medications on file.     Current Outpatient Medications Ordered in Epic   Medication Sig Dispense Refill    acetaminophen (TYLENOL) 500 MG tablet Take  by mouth.      cholecalciferol (VITAMIN D3) 1.25 MG (32685 UT) capsule Take 1 capsule by mouth.      clidinium-chlordiazePOXIDE (LIBRAX) 5-2.5 MG per capsule Take 1 capsule by mouth.      Cyanocobalamin 1000 MCG lozenge Take 1,000 mcg by mouth Daily.      Diclofenac Sodium (VOLTAREN) 1 % gel gel Apply 4 g topically to the appropriate area as directed 2 (Two) Times a Day. 100 g 0    gabapentin (NEURONTIN) 100 MG capsule Take 1 capsule by mouth.      latanoprost (XALATAN) 0.005 % ophthalmic solution       Lidocaine 4 % Place 1 patch on the skin as directed by provider Daily. 5 each 0    ondansetron ODT (ZOFRAN-ODT) 4 MG disintegrating tablet       pantoprazole (PROTONIX) 40 MG EC tablet Take 1 tablet by mouth Daily.      sucralfate (CARAFATE) 1 g tablet Take 1 pill 30 mins before a meal 3  times a day and at bedtime as needed. Crush 1 pill and mix with 2-3 tbsp of water. Avoid taking other meds within 1 hr before or 2 hrs after taking Sucralfate.           PROCEDURES  Procedures            PROGRESS, DATA ANALYSIS, CONSULTS, AND MEDICAL DECISION MAKING  All labs have been independently interpreted by me.  All radiology studies have been reviewed by me. All EKG's have been independently viewed and interpreted by me.  Discussion below represents my analysis of pertinent findings related to patient's condition, differential diagnosis, treatment plan and final disposition.    DIFFERENTIAL    Differential diagnosis includes but is not limited to:  - hepatobiliary pathology such as cholecystitis, cholangitis, and symptomatic cholelithiasis  - Pancreatitis  - Dyspepsia  - Small bowel obstruction  - Appendicitis  - Diverticulitis  - UTI including pyelonephritis  - Ureteral stone  - Zoster  - Colitis, including infectious and ischemic  - Atypical ACS      Clinical Scores:                  ED Course as of 02/26/25 1227   Wed Feb 26, 2025   0828 WBC: 9.26 [KA]   0828 Hemoglobin(!): 10.2  Chronic stable disease [RS]   0828 Glucose(!): 104 [KA]   0828 Creatinine: 0.59 [KA]   0828 Lactate: 1.7 [KA]   0828 Lipase: 16 [KA]   0840 Lipase: 16 [KA]   0840 Lactate: 1.7 [KA]   0929 RBC, UA(!): 11-20 [KA]   0944 WBC, UA: 0-2 [RS]   0944 Bacteria, UA: None Seen [RS]   0944 Nitrite, UA: Negative [RS]   0944 RADIOLOGY      Study: Noncontrast CT abdomen pelvis  Findings: No evidence of bowel obstruction  I independently viewed and interpreted these images contemporaneously with treatment.    [RS]   1006 I had a long discussion with the patient and her family.  Patient is very likely struggling with either gastritis or duodenitis.  She even has the potential for duodenal ulcer.  She is been on quite a lot of medications recently.  She does see gastroenterology, Dr. Soliman, and has good follow-up scheduled.  She is not taking  the Carafate because she was worried it was making her anxious.  It turns out that probably the Carafate is limiting her absorption of her other medications.  I instructed her to take her other medications 30 minutes before any doses of Carafate and this will help.  Patient still having discomfort and we talked about this being an issue that is going to take some time to resolve.  No movement pain medications will help.  In fact I recommend holding off on all oral pain medications with Tylenol and switching to topical analgesics for back pain.  She requests additional information about a second opinion.  We will send her to see our neurosurgeons as well.  Patient and family agreeable discharge planning. [RS]      ED Course User Index  [KA] Kaitlynn Shaw PA-C  [RS] Felipe Swanson MD             AS OF 12:27 EST VITALS:    BP - 145/62  HR - 84  TEMP - 98 °F (36.7 °C)  O2 SATS - 97%    COMPLEXITY OF CARE  Admission was considered but after careful review of the patient's presentation, physical examination, diagnostic results, and response to treatment the patient may be safely discharged with outpatient follow-up.      DIAGNOSIS  Final diagnoses:   Abdominal pain, acute, right upper quadrant   Chronic lumbar compression fracture, closed, initial encounter         DISPOSITION  ED Disposition       ED Disposition   Discharge    Condition   Stable    Comment   --                  FOLLOW UP  Nitesh Fuentes MD  [address] [place] [address] , [facility] [address]  [facility] [id number] [phone]    Schedule an appointment as soon as possible for a visit       Ramírez Soliman MD  [address], [facility] [address]  [facility] [id number] [phone]    Call today      Demarcus Morales MD  [address] [facility] [address]  [facility] [id number] [phone]    Call today  Schedule close outpatient follow-up        Prescribed Medications     Medication List        New Prescriptions      Diclofenac Sodium 1 % gel gel  Commonly known as:  VOLTAREN  Apply 4 g topically to the appropriate area as directed 2 (Two) Times a Day.     Lidocaine 4 %  Place 1 patch on the skin as directed by provider Daily.            Stop      TRAMADOL & DIETARY MANAGE PROD PO               Where to Get Your Medications        You can get these medications from any pharmacy    Bring a paper prescription for each of these medications  Diclofenac Sodium 1 % gel gel  Lidocaine 4 %                   Please note that portions of this document were completed with a voice recognition program.    Note Disclaimer: At [facility], we believe that sharing information builds trust and better relationships. You are receiving this note because you recently visited [facility]. It is possible you will see health information before a provider has talked with you about it. This kind of information can be easy to misunderstand. To help you fully understand what it means for your health, we urge you to discuss this note with your provider.         Kaitlynn Shaw PA-C  02/26/25 7251